# Patient Record
Sex: FEMALE | Race: OTHER | HISPANIC OR LATINO | URBAN - METROPOLITAN AREA
[De-identification: names, ages, dates, MRNs, and addresses within clinical notes are randomized per-mention and may not be internally consistent; named-entity substitution may affect disease eponyms.]

---

## 2019-10-31 ENCOUNTER — EMERGENCY (EMERGENCY)
Facility: HOSPITAL | Age: 39
LOS: 0 days | Discharge: HOME | End: 2019-10-31
Attending: EMERGENCY MEDICINE | Admitting: EMERGENCY MEDICINE
Payer: MEDICAID

## 2019-10-31 VITALS
DIASTOLIC BLOOD PRESSURE: 72 MMHG | RESPIRATION RATE: 18 BRPM | HEART RATE: 96 BPM | TEMPERATURE: 99 F | SYSTOLIC BLOOD PRESSURE: 150 MMHG | OXYGEN SATURATION: 100 %

## 2019-10-31 DIAGNOSIS — D64.9 ANEMIA, UNSPECIFIED: ICD-10-CM

## 2019-10-31 DIAGNOSIS — R00.2 PALPITATIONS: ICD-10-CM

## 2019-10-31 DIAGNOSIS — N12 TUBULO-INTERSTITIAL NEPHRITIS, NOT SPECIFIED AS ACUTE OR CHRONIC: ICD-10-CM

## 2019-10-31 DIAGNOSIS — E87.6 HYPOKALEMIA: ICD-10-CM

## 2019-10-31 DIAGNOSIS — R10.84 GENERALIZED ABDOMINAL PAIN: ICD-10-CM

## 2019-10-31 LAB
ALBUMIN SERPL ELPH-MCNC: 4.2 G/DL — SIGNIFICANT CHANGE UP (ref 3.5–5.2)
ALP SERPL-CCNC: 56 U/L — SIGNIFICANT CHANGE UP (ref 30–115)
ALT FLD-CCNC: 9 U/L — SIGNIFICANT CHANGE UP (ref 0–41)
ANION GAP SERPL CALC-SCNC: 13 MMOL/L — SIGNIFICANT CHANGE UP (ref 7–14)
APPEARANCE UR: ABNORMAL
AST SERPL-CCNC: 15 U/L — SIGNIFICANT CHANGE UP (ref 0–41)
BACTERIA # UR AUTO: ABNORMAL
BASOPHILS # BLD AUTO: 0.01 K/UL — SIGNIFICANT CHANGE UP (ref 0–0.2)
BASOPHILS NFR BLD AUTO: 0.3 % — SIGNIFICANT CHANGE UP (ref 0–1)
BILIRUB DIRECT SERPL-MCNC: <0.2 MG/DL — SIGNIFICANT CHANGE UP (ref 0–0.2)
BILIRUB INDIRECT FLD-MCNC: >0.3 MG/DL — SIGNIFICANT CHANGE UP (ref 0.2–1.2)
BILIRUB SERPL-MCNC: 0.5 MG/DL — SIGNIFICANT CHANGE UP (ref 0.2–1.2)
BILIRUB UR-MCNC: NEGATIVE — SIGNIFICANT CHANGE UP
BUN SERPL-MCNC: 9 MG/DL — LOW (ref 10–20)
CALCIUM SERPL-MCNC: 9.1 MG/DL — SIGNIFICANT CHANGE UP (ref 8.5–10.1)
CHLORIDE SERPL-SCNC: 98 MMOL/L — SIGNIFICANT CHANGE UP (ref 98–110)
CO2 SERPL-SCNC: 26 MMOL/L — SIGNIFICANT CHANGE UP (ref 17–32)
COLOR SPEC: YELLOW — SIGNIFICANT CHANGE UP
CREAT SERPL-MCNC: 0.5 MG/DL — LOW (ref 0.7–1.5)
DIFF PNL FLD: NEGATIVE — SIGNIFICANT CHANGE UP
EOSINOPHIL # BLD AUTO: 0.02 K/UL — SIGNIFICANT CHANGE UP (ref 0–0.7)
EOSINOPHIL NFR BLD AUTO: 0.5 % — SIGNIFICANT CHANGE UP (ref 0–8)
EPI CELLS # UR: 6 /HPF — HIGH (ref 0–5)
GLUCOSE SERPL-MCNC: 97 MG/DL — SIGNIFICANT CHANGE UP (ref 70–99)
GLUCOSE UR QL: NEGATIVE — SIGNIFICANT CHANGE UP
HCT VFR BLD CALC: 26.4 % — LOW (ref 37–47)
HGB BLD-MCNC: 7.3 G/DL — LOW (ref 12–16)
HYALINE CASTS # UR AUTO: 35 /LPF — HIGH (ref 0–7)
IMM GRANULOCYTES NFR BLD AUTO: 0 % — LOW (ref 0.1–0.3)
KETONES UR-MCNC: ABNORMAL
LACTATE SERPL-SCNC: 0.9 MMOL/L — SIGNIFICANT CHANGE UP (ref 0.5–2.2)
LEUKOCYTE ESTERASE UR-ACNC: ABNORMAL
LIDOCAIN IGE QN: 210 U/L — HIGH (ref 7–60)
LYMPHOCYTES # BLD AUTO: 1.33 K/UL — SIGNIFICANT CHANGE UP (ref 1.2–3.4)
LYMPHOCYTES # BLD AUTO: 34.8 % — SIGNIFICANT CHANGE UP (ref 20.5–51.1)
MAGNESIUM SERPL-MCNC: 2.2 MG/DL — SIGNIFICANT CHANGE UP (ref 1.8–2.4)
MCHC RBC-ENTMCNC: 17.5 PG — LOW (ref 27–31)
MCHC RBC-ENTMCNC: 27.7 G/DL — LOW (ref 32–37)
MCV RBC AUTO: 63.5 FL — LOW (ref 81–99)
MONOCYTES # BLD AUTO: 0.34 K/UL — SIGNIFICANT CHANGE UP (ref 0.1–0.6)
MONOCYTES NFR BLD AUTO: 8.9 % — SIGNIFICANT CHANGE UP (ref 1.7–9.3)
NEUTROPHILS # BLD AUTO: 2.12 K/UL — SIGNIFICANT CHANGE UP (ref 1.4–6.5)
NEUTROPHILS NFR BLD AUTO: 55.5 % — SIGNIFICANT CHANGE UP (ref 42.2–75.2)
NITRITE UR-MCNC: NEGATIVE — SIGNIFICANT CHANGE UP
NRBC # BLD: 0 /100 WBCS — SIGNIFICANT CHANGE UP (ref 0–0)
NT-PROBNP SERPL-SCNC: 41 PG/ML — SIGNIFICANT CHANGE UP (ref 0–300)
PH UR: 6 — SIGNIFICANT CHANGE UP (ref 5–8)
PLATELET # BLD AUTO: 200 K/UL — SIGNIFICANT CHANGE UP (ref 130–400)
POTASSIUM SERPL-MCNC: 2.9 MMOL/L — LOW (ref 3.5–5)
POTASSIUM SERPL-SCNC: 2.9 MMOL/L — LOW (ref 3.5–5)
PROT SERPL-MCNC: 7.5 G/DL — SIGNIFICANT CHANGE UP (ref 6–8)
PROT UR-MCNC: ABNORMAL
RBC # BLD: 4.16 M/UL — LOW (ref 4.2–5.4)
RBC # FLD: 20.8 % — HIGH (ref 11.5–14.5)
RBC CASTS # UR COMP ASSIST: 15 /HPF — HIGH (ref 0–4)
SODIUM SERPL-SCNC: 137 MMOL/L — SIGNIFICANT CHANGE UP (ref 135–146)
SP GR SPEC: 1.02 — SIGNIFICANT CHANGE UP (ref 1.01–1.02)
TROPONIN T SERPL-MCNC: <0.01 NG/ML — SIGNIFICANT CHANGE UP
UROBILINOGEN FLD QL: SIGNIFICANT CHANGE UP
WBC # BLD: 3.82 K/UL — LOW (ref 4.8–10.8)
WBC # FLD AUTO: 3.82 K/UL — LOW (ref 4.8–10.8)
WBC UR QL: 42 /HPF — HIGH (ref 0–5)

## 2019-10-31 PROCEDURE — 74177 CT ABD & PELVIS W/CONTRAST: CPT | Mod: 26

## 2019-10-31 PROCEDURE — 71046 X-RAY EXAM CHEST 2 VIEWS: CPT | Mod: 26

## 2019-10-31 PROCEDURE — 93010 ELECTROCARDIOGRAM REPORT: CPT

## 2019-10-31 PROCEDURE — 99053 MED SERV 10PM-8AM 24 HR FAC: CPT

## 2019-10-31 PROCEDURE — 99285 EMERGENCY DEPT VISIT HI MDM: CPT

## 2019-10-31 RX ORDER — SODIUM CHLORIDE 9 MG/ML
1000 INJECTION, SOLUTION INTRAVENOUS ONCE
Refills: 0 | Status: COMPLETED | OUTPATIENT
Start: 2019-10-31 | End: 2019-10-31

## 2019-10-31 RX ORDER — CEFDINIR 250 MG/5ML
1 POWDER, FOR SUSPENSION ORAL
Qty: 20 | Refills: 0
Start: 2019-10-31 | End: 2019-11-09

## 2019-10-31 RX ORDER — POTASSIUM CHLORIDE 20 MEQ
40 PACKET (EA) ORAL ONCE
Refills: 0 | Status: COMPLETED | OUTPATIENT
Start: 2019-10-31 | End: 2019-10-31

## 2019-10-31 RX ADMIN — SODIUM CHLORIDE 1000 MILLILITER(S): 9 INJECTION, SOLUTION INTRAVENOUS at 08:01

## 2019-10-31 NOTE — ED PROVIDER NOTE - ATTENDING CONTRIBUTION TO CARE
40 y/o female with no relevant PMHx presents to ED for generalized abdominal pain x 3 weeks.  No fever or chills.  (+) dysuria with frequency.  No hematuria.  No vomiting or diarrhea.  No fever or chills.  No CP/SOB.  PE:  agree with above.  A/P:  Abdominal Pain.  Labs, Imaging and Reassess.

## 2019-10-31 NOTE — ED PROVIDER NOTE - PHYSICAL EXAMINATION
VITAL SIGNS: I have reviewed nursing notes and confirm.  CONSTITUTIONAL: Well-developed; well-nourished; in no acute distress. pt comfortable lying in bed.   SKIN: skin exam is warm and dry, no acute rash.   HEAD: Normocephalic; atraumatic.  EYES:  EOM intact; conjunctiva and sclera clear.  ENT: No nasal discharge; airway clear. moist oral mucosa;   NECK: Supple; non tender.  CARD: S1, S2 normal; no murmurs, gallops, or rubs. Regular rate and rhythm. posterior tibial and radial pulses 2+  RESP: No wheezes, rales or rhonchi. cta b/l. no use of accessory muscles. no retractions  ABD: Normal bowel sounds; soft; non-distended; +generalized tenderness; no rebound. negative psoas, rovsign's and murphys.  EXT: Normal ROM. No  cyanosis or edema.  BACK: No cva tenderness  LYMPH: No acute cervical adenopathy.  NEURO: Alert, oriented, grossly unremarkable.    PSYCH: Cooperative, appropriate. VITAL SIGNS: I have reviewed nursing notes and confirm.  CONSTITUTIONAL: Well-developed; well-nourished; in no acute distress. pt comfortable lying in bed.   SKIN: skin exam is warm and dry, no acute rash.   HEAD: Normocephalic; atraumatic.  EYES:  EOM intact; conjunctiva and sclera clear.  ENT: No nasal discharge; airway clear. moist oral mucosa;   NECK: Supple; non tender.  CARD: S1, S2 normal; no murmurs, gallops, or rubs. Regular rate and rhythm. posterior tibial and radial pulses 2+  RESP: No wheezes, rales or rhonchi. cta b/l. no use of accessory muscles. no retractions  ABD: Normal bowel sounds; soft; non-distended; +generalized tenderness; no rebound. negative psoas, rovsign's and murphys.  Rectal exam: brown stool. no brbpr, no melena  EXT: Normal ROM. No  cyanosis or edema.  BACK: No cva tenderness  LYMPH: No acute cervical adenopathy.  NEURO: Alert, oriented, grossly unremarkable.    PSYCH: Cooperative, appropriate.

## 2019-10-31 NOTE — ED ADULT NURSE NOTE - OBJECTIVE STATEMENT
39 year old female complaining of generalized abdominal pain for 20 days, last bowel movement 8 days ago, patient denies any n/v/d. patient states she had palpations starting two days ago, denies any chest pain or sob. patient denies any gu s/s,

## 2019-10-31 NOTE — ED PROVIDER NOTE - OBJECTIVE STATEMENT
38y/o F w/ no pmh presents for generalized abdominal pain on and off for 20 days, getting worse 7/10.  pt denies diarrhea or vomiting.  last bm 8 days ago, no hx of constipation, no hx of surgeries, +passing gas. no sick contacts. yesterday and today pt with chills, no fever taken.  today pt felt her heart raising.  no sob, no cp.

## 2019-10-31 NOTE — ED PROVIDER NOTE - CARE PROVIDER_API CALL
Jacinto Redmond)  Gastroenterology; Internal Medicine  [address] [place] [address]  Phone: [phone]  Fax: [phone]  Follow Up Time:

## 2019-10-31 NOTE — ED PROVIDER NOTE - NSFOLLOWUPINSTRUCTIONS_ED_ALL_ED_FT
Pyelonephritis    Pyelonephritis is a kidney infection. In most cases, the infection clears up with treatment and does not cause further problems. More severe infections or chronic infections can sometimes spread to the bloodstream or lead to other problems with the kidneys. Symptoms include frequent or painful urination, abdominal pain, back pain, flank pain, fever/chills, nausea, or vomiting. If you were prescribed an antibiotic medicine, take it as told by your health care provider. Do not stop taking the antibiotic even if you start to feel better.    SEEK IMMEDIATE MEDICAL CARE IF YOU HAVE ANY OF THE FOLLOWING SYMPTOMS: inability to hold down antibiotics or fluids, worsening pain, dizziness/lightheadedness, or change in mental status.    Siga con samson doctor primario, obgyn y gastroenterologo.

## 2019-10-31 NOTE — ED PROVIDER NOTE - PATIENT PORTAL LINK FT
You can access the FollowMyHealth Patient Portal offered by [facility] by registering at the following website: [email or web address]. By joining [employer]’s FollowMyHealth portal, you will also be able to view your health information using other applications (apps) compatible with our system.

## 2019-10-31 NOTE — ED PROVIDER NOTE - CLINICAL SUMMARY MEDICAL DECISION MAKING FREE TEXT BOX
CT shows possible pyelonephritis.  Patient known to have anemia.  Will f/u with PMD.  Rx given Abx.  Stable for dc

## 2019-10-31 NOTE — ED PROVIDER NOTE - CARE PLAN
Principal Discharge DX:	Pyelonephritis Principal Discharge DX:	Pyelonephritis  Secondary Diagnosis:	Anemia  Secondary Diagnosis:	Hypokalemia

## 2019-10-31 NOTE — ED PROVIDER NOTE - NSFOLLOWUPCLINICS_GEN_ALL_ED_FT
[facility]  Medicine  [address], [place]   Phone: [phone]  Fax:     [facility]  OB/GYN  [address] [place] [address]  Phone: [phone]  Fax:   Follow Up Time:

## 2019-11-01 LAB
CULTURE RESULTS: NO GROWTH — SIGNIFICANT CHANGE UP
SPECIMEN SOURCE: SIGNIFICANT CHANGE UP

## 2019-11-09 ENCOUNTER — INPATIENT (INPATIENT)
Facility: HOSPITAL | Age: 39
LOS: 1 days | Discharge: HOME | End: 2019-11-11
Attending: HOSPITALIST | Admitting: HOSPITALIST
Payer: MEDICAID

## 2019-11-09 VITALS
RESPIRATION RATE: 20 BRPM | TEMPERATURE: 98 F | OXYGEN SATURATION: 100 % | HEART RATE: 98 BPM | SYSTOLIC BLOOD PRESSURE: 137 MMHG | DIASTOLIC BLOOD PRESSURE: 84 MMHG

## 2019-11-09 LAB
ABO RH CONFIRMATION: SIGNIFICANT CHANGE UP
ALBUMIN SERPL ELPH-MCNC: 4.2 G/DL — SIGNIFICANT CHANGE UP (ref 3.5–5.2)
ALP SERPL-CCNC: 53 U/L — SIGNIFICANT CHANGE UP (ref 30–115)
ALT FLD-CCNC: 14 U/L — SIGNIFICANT CHANGE UP (ref 0–41)
ANION GAP SERPL CALC-SCNC: 12 MMOL/L — SIGNIFICANT CHANGE UP (ref 7–14)
AST SERPL-CCNC: 17 U/L — SIGNIFICANT CHANGE UP (ref 0–41)
BILIRUB SERPL-MCNC: 0.4 MG/DL — SIGNIFICANT CHANGE UP (ref 0.2–1.2)
BLD GP AB SCN SERPL QL: SIGNIFICANT CHANGE UP
BUN SERPL-MCNC: 4 MG/DL — LOW (ref 10–20)
CALCIUM SERPL-MCNC: 8.9 MG/DL — SIGNIFICANT CHANGE UP (ref 8.5–10.1)
CHLORIDE SERPL-SCNC: 104 MMOL/L — SIGNIFICANT CHANGE UP (ref 98–110)
CO2 SERPL-SCNC: 22 MMOL/L — SIGNIFICANT CHANGE UP (ref 17–32)
CREAT SERPL-MCNC: <0.5 MG/DL — LOW (ref 0.7–1.5)
GLUCOSE SERPL-MCNC: 91 MG/DL — SIGNIFICANT CHANGE UP (ref 70–99)
HCT VFR BLD CALC: 25.4 % — LOW (ref 37–47)
HGB BLD-MCNC: 6.9 G/DL — CRITICAL LOW (ref 12–16)
LACTATE SERPL-SCNC: 0.8 MMOL/L — SIGNIFICANT CHANGE UP (ref 0.5–2.2)
LIDOCAIN IGE QN: 89 U/L — HIGH (ref 7–60)
MCHC RBC-ENTMCNC: 17.5 PG — LOW (ref 27–31)
MCHC RBC-ENTMCNC: 27.2 G/DL — LOW (ref 32–37)
MCV RBC AUTO: 64.3 FL — LOW (ref 81–99)
NRBC # BLD: 0 /100 WBCS — SIGNIFICANT CHANGE UP (ref 0–0)
PLATELET # BLD AUTO: 246 K/UL — SIGNIFICANT CHANGE UP (ref 130–400)
POTASSIUM SERPL-MCNC: 3.6 MMOL/L — SIGNIFICANT CHANGE UP (ref 3.5–5)
POTASSIUM SERPL-SCNC: 3.6 MMOL/L — SIGNIFICANT CHANGE UP (ref 3.5–5)
PROT SERPL-MCNC: 7.2 G/DL — SIGNIFICANT CHANGE UP (ref 6–8)
RBC # BLD: 3.95 M/UL — LOW (ref 4.2–5.4)
RBC # FLD: 20.8 % — HIGH (ref 11.5–14.5)
SODIUM SERPL-SCNC: 138 MMOL/L — SIGNIFICANT CHANGE UP (ref 135–146)
WBC # BLD: 3.77 K/UL — LOW (ref 4.8–10.8)
WBC # FLD AUTO: 3.77 K/UL — LOW (ref 4.8–10.8)

## 2019-11-09 PROCEDURE — 74019 RADEX ABDOMEN 2 VIEWS: CPT | Mod: 26

## 2019-11-09 PROCEDURE — 99285 EMERGENCY DEPT VISIT HI MDM: CPT

## 2019-11-09 RX ORDER — SODIUM CHLORIDE 9 MG/ML
1000 INJECTION INTRAMUSCULAR; INTRAVENOUS; SUBCUTANEOUS ONCE
Refills: 0 | Status: COMPLETED | OUTPATIENT
Start: 2019-11-09 | End: 2019-11-09

## 2019-11-09 RX ORDER — INFLUENZA VIRUS VACCINE 15; 15; 15; 15 UG/.5ML; UG/.5ML; UG/.5ML; UG/.5ML
0.5 SUSPENSION INTRAMUSCULAR ONCE
Refills: 0 | Status: DISCONTINUED | OUTPATIENT
Start: 2019-11-09 | End: 2019-11-10

## 2019-11-09 RX ADMIN — SODIUM CHLORIDE 1000 MILLILITER(S): 9 INJECTION INTRAMUSCULAR; INTRAVENOUS; SUBCUTANEOUS at 18:58

## 2019-11-09 NOTE — ED PROVIDER NOTE - OBJECTIVE STATEMENT
40 y/o female with no significant PMH who presents to the ED for evaluation of constipation and abdominal pain. Patient states she has not had a bowel movement or passed gas in the last two weeks. abdominal pain has gradually worsened, nonradiating, and described at crampy in nature. patient states she has been drinking olive oil for constipation which has not alleviated her symptoms. patient admits to one episode of vomiting today, and feels nauseous. patient is tolerating PO. Patient was seen on 10/31 for similar symptoms, and was diagnosed with pyelonephritis, and a right adnexal cyst.  Patient denies abdominal surgery, fever, chills, bodyaches, rashes, joint pain, bleeding from the rectum, urinary symptoms, chest pain, sob, or back pain.

## 2019-11-09 NOTE — ED PROVIDER NOTE - ATTENDING CONTRIBUTION TO CARE
40 yo F presents to Ed for constipation and abdominal discomfort x2 weeks. Patient state she has had small pellet like BM. No blood or black stool. No dysuria, hematuria, vaginal discharge. No CP, SOB, palpitations, fever, chills.     On PE patient's abdomen is SNTND. Cardio RRR, lungs CTA.    Concern for constipation. Patient was in the ED recently for abdominal pain who had a CT scan with no significant bowel pathology. No abdominal surgeries.     Will do basic blood work and abdominal xray.

## 2019-11-09 NOTE — ED PROVIDER NOTE - CLINICAL SUMMARY MEDICAL DECISION MAKING FREE TEXT BOX
38 yo F presented to ED for abdominal pain due to constipation.   Patient was found to have HGB of 6.9. She had a HGB of 7.3 in october but has never been worked up for anemia in the past. No rectal bleeding.   Will give blood and admit for new onset anemia.

## 2019-11-09 NOTE — ED PROVIDER NOTE - PHYSICAL EXAMINATION
CONST: Well appearing in NAD  EYES: PERRL, EOMI, Sclera and conjunctiva clear.   ENT: No nasal discharge. oropharynx normal appearing, no erythema or exudates. Uvula midline.  CARD: Normal S1 S2; Normal rate and rhythm  RESP: Equal BS B/L, No wheezes, rhonchi or rales. No distress  GI: tenderness to palpation of bilateral lower quadrants. Soft, non-distended. no organomegaly. negative murphys sign. no bowel sounds to ausculation.   SKIN: Warm, dry, no acute rashes. mild jaundice noted.   NEURO: A&Ox3. CONST: Well appearing in NAD  EYES: PERRL, EOMI, Sclera and conjunctiva clear. pyterium of right eye.   ENT: No nasal discharge. oropharynx normal appearing, no erythema or exudates. Uvula midline.  CARD: Normal S1 S2; Normal rate and rhythm  RESP: Equal BS B/L, No wheezes, rhonchi or rales. No distress  GI: tenderness to palpation of bilateral lower quadrants. Soft, non-distended. no organomegaly. negative murphys sign. no bowel sounds to ausculation.   SKIN: Warm, dry, no acute rashes.   NEURO: A&Ox3. CONST: Well appearing in NAD  EYES: PERRL, EOMI, Sclera and conjunctiva clear. pyterium of right eye.   ENT: No nasal discharge. oropharynx normal appearing, no erythema or exudates. Uvula midline.  CARD: Normal S1 S2; Normal rate and rhythm  RESP: Equal BS B/L, No wheezes, rhonchi or rales. No distress  GI: tenderness to palpation of bilateral lower quadrants. Soft, non-distended. no organomegaly. negative murphys sign. decrease bowel sounds to ausculation.   RECTAL: no hemorrhoids. no fissures. no bleeding. minimal stool noted at the distal rectum. no masses palpated. no blood in the stool. rectal exam chaperoned by CHIO Ptael.    SKIN: Warm, dry, no acute rashes.   NEURO: A&Ox3.

## 2019-11-09 NOTE — ED PROVIDER NOTE - PROGRESS NOTE DETAILS
Made appointment for patient to fu in the clinic.   Monday at 12:45 Sign out to Dr. Coe.   Patient will receive 1 unit of RBC and go home with fu in the clinic on Monday for further anemia workup. CHIO Patel: I was directly involved in the care and management of this patient while supervising PA Fellow Stacia

## 2019-11-09 NOTE — ED PROVIDER NOTE - NS ED ROS FT
CONST: No fever, chills or bodyaches  EYES: No pain, redness, drainage or visual changes.  ENT: No ear pain or discharge, nasal discharge or congestion. No sore throat  CARD: No chest pain, palpitations  RESP: No SOB, cough, hemoptysis. No hx of asthma or COPD  GI: abdominal pain and nausea. constipation, and obstipation.    MS: No joint pain, back pain or extremity pain/injury  SKIN: No rashes  NEURO: No headache, dizziness, paresthesias or LOC

## 2019-11-09 NOTE — ED ADULT NURSE REASSESSMENT NOTE - NS ED NURSE REASSESS COMMENT FT1
Received pt from RN. PT A&O x3 comfortable appearing conversing with family members at bedside, Last VS WNL.m Ordered for T&S for lab and 1 unit PRBC for low H/H. Pt denies any discomfort of pain.
Pt started on PRBC 1 unit 2 RN to bedside, 15 minute check done, no distress noted. VS WNL. Pt admitted to [facility] as per MD to transfer on completion. Pt made aware.

## 2019-11-10 DIAGNOSIS — D64.9 ANEMIA, UNSPECIFIED: ICD-10-CM

## 2019-11-10 DIAGNOSIS — N83.209 UNSPECIFIED OVARIAN CYST, UNSPECIFIED SIDE: ICD-10-CM

## 2019-11-10 DIAGNOSIS — K59.00 CONSTIPATION, UNSPECIFIED: ICD-10-CM

## 2019-11-10 LAB
ANION GAP SERPL CALC-SCNC: 13 MMOL/L — SIGNIFICANT CHANGE UP (ref 7–14)
APPEARANCE UR: CLEAR — SIGNIFICANT CHANGE UP
APTT BLD: 29.8 SEC — SIGNIFICANT CHANGE UP (ref 27–39.2)
BACTERIA # UR AUTO: ABNORMAL
BILIRUB UR-MCNC: NEGATIVE — SIGNIFICANT CHANGE UP
BUN SERPL-MCNC: <3 MG/DL — LOW (ref 10–20)
CALCIUM SERPL-MCNC: 9.4 MG/DL — SIGNIFICANT CHANGE UP (ref 8.5–10.1)
CHLORIDE SERPL-SCNC: 104 MMOL/L — SIGNIFICANT CHANGE UP (ref 98–110)
CO2 SERPL-SCNC: 22 MMOL/L — SIGNIFICANT CHANGE UP (ref 17–32)
COLOR SPEC: YELLOW — SIGNIFICANT CHANGE UP
COMMENT - URINE: SIGNIFICANT CHANGE UP
CREAT SERPL-MCNC: <0.5 MG/DL — LOW (ref 0.7–1.5)
DIFF PNL FLD: ABNORMAL
EPI CELLS # UR: ABNORMAL /HPF
GLUCOSE SERPL-MCNC: 94 MG/DL — SIGNIFICANT CHANGE UP (ref 70–99)
GLUCOSE UR QL: NEGATIVE — SIGNIFICANT CHANGE UP
HCG UR QL: NEGATIVE — SIGNIFICANT CHANGE UP
HCT VFR BLD CALC: 31.8 % — LOW (ref 37–47)
HGB BLD-MCNC: 9 G/DL — LOW (ref 12–16)
INR BLD: 1.14 RATIO — SIGNIFICANT CHANGE UP (ref 0.65–1.3)
KETONES UR-MCNC: 80 — SIGNIFICANT CHANGE UP
LEUKOCYTE ESTERASE UR-ACNC: NEGATIVE — SIGNIFICANT CHANGE UP
MAGNESIUM SERPL-MCNC: 2.1 MG/DL — SIGNIFICANT CHANGE UP (ref 1.8–2.4)
MCHC RBC-ENTMCNC: 19.2 PG — LOW (ref 27–31)
MCHC RBC-ENTMCNC: 28.3 G/DL — LOW (ref 32–37)
MCV RBC AUTO: 67.8 FL — LOW (ref 81–99)
NITRITE UR-MCNC: NEGATIVE — SIGNIFICANT CHANGE UP
NRBC # BLD: 0 /100 WBCS — SIGNIFICANT CHANGE UP (ref 0–0)
PH UR: 7 — SIGNIFICANT CHANGE UP (ref 5–8)
PLATELET # BLD AUTO: 244 K/UL — SIGNIFICANT CHANGE UP (ref 130–400)
POTASSIUM SERPL-MCNC: 3.9 MMOL/L — SIGNIFICANT CHANGE UP (ref 3.5–5)
POTASSIUM SERPL-SCNC: 3.9 MMOL/L — SIGNIFICANT CHANGE UP (ref 3.5–5)
PROT UR-MCNC: NEGATIVE — SIGNIFICANT CHANGE UP
PROTHROM AB SERPL-ACNC: 13.1 SEC — HIGH (ref 9.95–12.87)
RBC # BLD: 4.69 M/UL — SIGNIFICANT CHANGE UP (ref 4.2–5.4)
RBC # FLD: 22.4 % — HIGH (ref 11.5–14.5)
RBC CASTS # UR COMP ASSIST: ABNORMAL /HPF
SODIUM SERPL-SCNC: 139 MMOL/L — SIGNIFICANT CHANGE UP (ref 135–146)
SP GR SPEC: 1.02 — SIGNIFICANT CHANGE UP (ref 1.01–1.03)
UROBILINOGEN FLD QL: 0.2 — SIGNIFICANT CHANGE UP (ref 0.2–0.2)
WBC # BLD: 4.04 K/UL — LOW (ref 4.8–10.8)
WBC # FLD AUTO: 4.04 K/UL — LOW (ref 4.8–10.8)
WBC UR QL: SIGNIFICANT CHANGE UP /HPF

## 2019-11-10 PROCEDURE — 99223 1ST HOSP IP/OBS HIGH 75: CPT | Mod: AI

## 2019-11-10 PROCEDURE — 76856 US EXAM PELVIC COMPLETE: CPT | Mod: 26

## 2019-11-10 RX ORDER — ACETAMINOPHEN 500 MG
650 TABLET ORAL EVERY 6 HOURS
Refills: 0 | Status: DISCONTINUED | OUTPATIENT
Start: 2019-11-10 | End: 2019-11-11

## 2019-11-10 RX ORDER — CHLORHEXIDINE GLUCONATE 213 G/1000ML
1 SOLUTION TOPICAL
Refills: 0 | Status: DISCONTINUED | OUTPATIENT
Start: 2019-11-10 | End: 2019-11-11

## 2019-11-10 RX ORDER — FERROUS SULFATE 325(65) MG
325 TABLET ORAL ONCE
Refills: 0 | Status: COMPLETED | OUTPATIENT
Start: 2019-11-10 | End: 2019-11-10

## 2019-11-10 RX ORDER — LACTULOSE 10 G/15ML
10 SOLUTION ORAL ONCE
Refills: 0 | Status: COMPLETED | OUTPATIENT
Start: 2019-11-10 | End: 2019-11-10

## 2019-11-10 RX ORDER — LACTULOSE 10 G/15ML
10 SOLUTION ORAL THREE TIMES A DAY
Refills: 0 | Status: DISCONTINUED | OUTPATIENT
Start: 2019-11-10 | End: 2019-11-11

## 2019-11-10 RX ORDER — FAMOTIDINE 10 MG/ML
20 INJECTION INTRAVENOUS DAILY
Refills: 0 | Status: DISCONTINUED | OUTPATIENT
Start: 2019-11-10 | End: 2019-11-10

## 2019-11-10 RX ORDER — FERROUS SULFATE 325(65) MG
1 TABLET ORAL
Qty: 60 | Refills: 0
Start: 2019-11-10 | End: 2019-12-09

## 2019-11-10 RX ORDER — LACTULOSE 10 G/15ML
15 SOLUTION ORAL
Qty: 5 | Refills: 0
Start: 2019-11-10

## 2019-11-10 RX ORDER — ONDANSETRON 8 MG/1
4 TABLET, FILM COATED ORAL EVERY 8 HOURS
Refills: 0 | Status: DISCONTINUED | OUTPATIENT
Start: 2019-11-10 | End: 2019-11-11

## 2019-11-10 RX ORDER — FERROUS SULFATE 325(65) MG
325 TABLET ORAL DAILY
Refills: 0 | Status: DISCONTINUED | OUTPATIENT
Start: 2019-11-10 | End: 2019-11-11

## 2019-11-10 RX ORDER — LACTULOSE 10 G/15ML
15 SOLUTION ORAL
Qty: 0 | Refills: 0 | DISCHARGE
Start: 2019-11-10

## 2019-11-10 RX ORDER — FAMOTIDINE 10 MG/ML
20 INJECTION INTRAVENOUS DAILY
Refills: 0 | Status: DISCONTINUED | OUTPATIENT
Start: 2019-11-10 | End: 2019-11-11

## 2019-11-10 RX ADMIN — FAMOTIDINE 20 MILLIGRAM(S): 10 INJECTION INTRAVENOUS at 10:32

## 2019-11-10 RX ADMIN — LACTULOSE 10 GRAM(S): 10 SOLUTION ORAL at 10:22

## 2019-11-10 RX ADMIN — LACTULOSE 10 GRAM(S): 10 SOLUTION ORAL at 14:44

## 2019-11-10 RX ADMIN — ONDANSETRON 4 MILLIGRAM(S): 8 TABLET, FILM COATED ORAL at 10:37

## 2019-11-10 RX ADMIN — ONDANSETRON 4 MILLIGRAM(S): 8 TABLET, FILM COATED ORAL at 21:54

## 2019-11-10 RX ADMIN — LACTULOSE 10 GRAM(S): 10 SOLUTION ORAL at 21:33

## 2019-11-10 RX ADMIN — Medication 325 MILLIGRAM(S): at 10:22

## 2019-11-10 NOTE — DISCHARGE NOTE PROVIDER - NSDCMRMEDTOKEN_GEN_ALL_CORE_FT
cefdinir 300 mg oral capsule: 1 cap(s) orally 2 times a day ferrous sulfate 325 mg (65 mg elemental iron) oral tablet: 1 tab(s) orally 2 times a day Colace 100 mg oral capsule: 1 cap(s) orally 3 times a day   ferrous sulfate 325 mg (65 mg elemental iron) oral tablet: 1 tab(s) orally 2 times a day

## 2019-11-10 NOTE — DISCHARGE NOTE PROVIDER - CARE PROVIDER_API CALL
Herber Oconnell)  Internal Medicine  [address] [place] [address]  Phone: [phone]  Fax: [phone]  Follow Up Time:

## 2019-11-10 NOTE — H&P ADULT - HISTORY OF PRESENT ILLNESS
A 39 female with recent diagnosis of anemia 10/31 , ovarian cyst, presents with 1x week of abdominal pain and constipation, Pt reports having crampy abdominal pain , points at lower quadrants. pt denies similar abdominal pain in the past. PT reports did not have bowel movement or passing gas x 4 days.  PT reports usually has bowel movement every day. Pt reports took olive oil but did not help. Pt denies constipation in the past. PT reports nausea and vomiting since yesterday , vomited 3x , last time this morning, pt not sure if blood in vomit. Pt denies seen blood in stool in the past.    Pt reports has appetite but is scared to eat because she might vomit.   In ed pt was found to have Hbg 6.9 slight drop compared to 10/31 from 7.3.   PT reports finished her menstrual cycle yesterday, usually lasts for 6 days is regular, this time was a little heavier. Pt received one unit of blood in ED. PT denies fever or chills. Pt reports dizziness and shortness of breath. PT denies chest pain, burning with urination, palpitation, diarrhea.

## 2019-11-10 NOTE — H&P ADULT - ASSESSMENT
A 39 female with recent diagnosis of anemia 10/31 , ovarian cyst, presents  with 1x week of abdominal pain admitted for constipation and anemia.

## 2019-11-10 NOTE — DISCHARGE NOTE PROVIDER - NSDCCPCAREPLAN_GEN_ALL_CORE_FT
PRINCIPAL DISCHARGE DIAGNOSIS  Diagnosis: Anemia  Assessment and Plan of Treatment:       SECONDARY DISCHARGE DIAGNOSES  Diagnosis: Constipation  Assessment and Plan of Treatment: PRINCIPAL DISCHARGE DIAGNOSIS  Diagnosis: Anemia  Assessment and Plan of Treatment: take iron pills.  MAKE APPOINTMENT WITH OBGYN      SECONDARY DISCHARGE DIAGNOSES  Diagnosis: Constipation  Assessment and Plan of Treatment: take stool softeners

## 2019-11-10 NOTE — DISCHARGE NOTE PROVIDER - NSFOLLOWUPCLINICS_GEN_ALL_ED_FT
[facility]  Medicine  [address], [place]   Phone: [phone]  Fax:   Follow Up Time: 1 week

## 2019-11-10 NOTE — DISCHARGE NOTE PROVIDER - HOSPITAL COURSE
A 39 female with recent diagnosis of anemia 10/31 , ovarian cyst, presents with 1x week of abdominal pain and constipation, Pt reports having crampy abdominal pain , points at lower quadrants. pt denies similar abdominal pain in the past. PT reports did not have bowel movement or passing gas x 4 days.   PT reports nausea and vomiting since yesterday , vomited 3x , last time this morning, pt not sure if blood in vomit. Pt denies seen blood in stool in the past.     Pt tolerated clears in hospital no vomiting since admission     Pt with anemia, in  ed pt was found to have Hbg 6.9  yesterday slight drop compared to 10/31 from 7.3.     Pt received one unit of blood yesterday, repeat hemoglobin 9 today     PT hx of ovarian cyst, US pelvis transvaginal unremarkable     Pt cleared for discharge , case discussed with the attending A 39 female with recent diagnosis of anemia 10/31 , ovarian cyst, presents with 1x week of abdominal pain and constipation, Pt reports having crampy abdominal pain , points at lower quadrants. pt denies similar abdominal pain in the past. PT reports did not have bowel movement or passing gas x 4 days.   PT reports nausea and vomiting since yesterday , vomited 3x , last time this morning, pt not sure if blood in vomit. Pt denies seen blood in stool in the past.     Pt tolerated clears in hospital no vomiting since admission     Pt was given lactulose had 1x bowel movement     Pt with anemia, in  ed pt was found to have Hbg 6.9  yesterday slight drop compared to 10/31 from 7.3.     Pt received one unit of blood yesterday, repeat hemoglobin 9 today     PT hx of ovarian cyst, US pelvis transvaginal unremarkable     Pt cleared for discharge , case discussed with the attending

## 2019-11-10 NOTE — H&P ADULT - ATTENDING COMMENTS
Patient seen and examined independently. Agree with PA note.  39 yr old female presented with c/o crampy abd pain and weakness.  VITAL SIGNS (Last 24 hrs):  T(C): 36.6 (11-10-19 @ 06:20), Max: 37.1 (19 @ 21:00)  HR: 102 (11-10-19 @ 06:20) (78 - 102)  BP: 138/78 (11-10-19 @ 06:20) (122/74 - 138/78)  RR: 18 (11-10-19 @ 06:20) (18 - 20)  SpO2: 100% (11-10-19 @ 04:50) (100% - 100%)  Wt(kg): --  Daily     Daily Weight in k.5 (10 Nov 2019 04:50)    I&O's Summary                        9.0    4.04  )-----------( 244      ( 10 Nov 2019 11:00 )             31.8   11-10    139  |  104  |  <3<L>  ----------------------------<  94  3.9   |  22  |  <0.5<L>    Ca    9.4      10 Nov 2019 11:00  Mg     2.1     11-10    TPro  7.2  /  Alb  4.2  /  TBili  0.4  /  DBili  x   /  AST  17  /  ALT  14  /  AlkPhos  53  11-09  o/e  aaox3  chest-b/l clear  abd-soft, bs+  no edema  assessment and plan:  # Severe anemia-s/p 1unit of PRBC--she had been having her periods and recently it was heavier than before-- pelvic sonogram was normal. Patient seen and examined independently. Agree with PA note.  39 yr old female presented with c/o crampy abd pain and weakness.  VITAL SIGNS (Last 24 hrs):  T(C): 36.6 (11-10-19 @ 06:20), Max: 37.1 (19 @ 21:00)  HR: 102 (11-10-19 @ 06:20) (78 - 102)  BP: 138/78 (11-10-19 @ 06:20) (122/74 - 138/78)  RR: 18 (11-10-19 @ 06:20) (18 - 20)  SpO2: 100% (11-10-19 @ 04:50) (100% - 100%)  Wt(kg): --  Daily     Daily Weight in k.5 (10 Nov 2019 04:50)    I&O's Summary                        9.0    4.04  )-----------( 244      ( 10 Nov 2019 11:00 )             31.8   11-10    139  |  104  |  <3<L>  ----------------------------<  94  3.9   |  22  |  <0.5<L>    Ca    9.4      10 Nov 2019 11:00  Mg     2.1     11-10    TPro  7.2  /  Alb  4.2  /  TBili  0.4  /  DBili  x   /  AST  17  /  ALT  14  /  AlkPhos  53  11-09  o/e  aaox3  chest-b/l clear  abd-soft, bs+  no edema  assessment and plan:  # Severe anemia-s/p 1unit of PRBC--she had been having her periods and recently it was heavier than before-- pelvic sonogram was normal.  she did a ct scan that showed pyelonephritis-- but UA was negative and there is no evidence of any symptoms.  She also confessed that she has not been eating well and has been starving as she has no money to support herself. will give her iron pills-- patuent is advised to repeat cbc later and follow in the [facility] clinic.  # constipation-- continue lactulose for today  and if has a bowel movement-- can go home. Patient seen and examined independently. Agree with PA note.  39 yr old female presented with c/o crampy abd pain and weakness.  VITAL SIGNS (Last 24 hrs):  T(C): 36.6 (11-10-19 @ 06:20), Max: 37.1 (19 @ 21:00)  HR: 102 (11-10-19 @ 06:20) (78 - 102)  BP: 138/78 (11-10-19 @ 06:20) (122/74 - 138/78)  RR: 18 (11-10-19 @ 06:20) (18 - 20)  SpO2: 100% (11-10-19 @ 04:50) (100% - 100%)  Wt(kg): --  Daily     Daily Weight in k.5 (10 Nov 2019 04:50)    I&O's Summary                        9.0    4.04  )-----------( 244      ( 10 Nov 2019 11:00 )             31.8   11-10    139  |  104  |  <3<L>  ----------------------------<  94  3.9   |  22  |  <0.5<L>    Ca    9.4      10 Nov 2019 11:00  Mg     2.1     11-10    TPro  7.2  /  Alb  4.2  /  TBili  0.4  /  DBili  x   /  AST  17  /  ALT  14  /  AlkPhos  53  11-09  o/e  aaox3  chest-b/l clear  abd-soft, bs+  no edema  assessment and plan:  # Severe anemia-s/p 1unit of PRBC--she had been having her periods and recently it was heavier than before-- pelvic sonogram was normal.  she did a ct scan that showed pyelonephritis-- but UA was negative and there is no evidence of any symptoms.  She also confessed that she has not been eating well and has been starving as she has no money to support herself. will give her iron pills-- patient is advised to repeat cbc later and follow in the [facility] clinic.  # constipation-- continue lactulose for today  and if has a bowel movement-- can go home.

## 2019-11-10 NOTE — H&P ADULT - NSHPLABSRESULTS_GEN_ALL_CORE
6.9    3.77  )-----------( 246      ( 09 Nov 2019 18:20 )             25.4       11-09    138  |  104  |  4<L>  ----------------------------<  91  3.6   |  22  |  <0.5<L>    Ca    8.9      09 Nov 2019 18:20    TPro  7.2  /  Alb  4.2  /  TBili  0.4  /  DBili  x   /  AST  17  /  ALT  14  /  AlkPhos  53  11-09            Lactate Trend 11-09 @ 18:20 Lactate:0.8     Culture Results:   No growth (10-31 @ 07:34)      < from: CT Abdomen and Pelvis w/ IV Cont (10.31.19 @ 10:19) >    IMPRESSION:     1. Mildly striated nephrogram involving the right kidney, suspicious for   underlying pyelonephritis; correlation with urinalysis is suggested.    2. Crenated 1.9 cm right adnexal cyst and trace free pelvic fluid, likely   physiologic.    < end of copied text >    < from: Xray Abdomen 2 Views (11.09.19 @ 19:33) >    Findings:     No evidence for bowel obstruction or pneumoperitoneum.  Overall mild colonic stool burden with mild to moderate stool in the   right colon.  No acute osseous abnormality.    Impression:     No evidence of bowel obstruction.      JANN CAIN M.D., ATTENDING RADIOLOGIST  This document has been electronically signed. Nov 9 2019 11:00PM    < end of copied text >

## 2019-11-10 NOTE — DISCHARGE NOTE NURSING/CASE MANAGEMENT/SOCIAL WORK - PATIENT PORTAL LINK FT
You can access the FollowMyHealth Patient Portal offered by [facility] by registering at the following website: [email or web address]. By joining [employer]’s FollowMyHealth portal, you will also be able to view your health information using other applications (apps) compatible with our system.

## 2019-11-10 NOTE — H&P ADULT - NSHPPHYSICALEXAM_GEN_ALL_CORE
VITALS:   ICU Vital Signs Last 24 Hrs  T(C): 36.6 (10 Nov 2019 06:20), Max: 37.1 (09 Nov 2019 21:00)  T(F): 97.8 (10 Nov 2019 06:20), Max: 98.7 (09 Nov 2019 21:00)  HR: 102 (10 Nov 2019 06:20) (78 - 102)  BP: 138/78 (10 Nov 2019 06:20) (122/74 - 138/78)  RR: 18 (10 Nov 2019 06:20) (18 - 20)  SpO2: 100% (10 Nov 2019 04:50) (100% - 100%)        GENERAL: NAD, lying in bed comfortably  HEAD:  Atraumatic, Normocephalic  EYES: EOMI, PERRLA, conjunctiva and sclera clear  CHEST/LUNG: Clear to auscultation bilaterally; No rales, rhonchi, wheezing, or rubs. Unlabored respirations  HEART: Regular rate and rhythm; No murmurs, rubs, or gallops  ABDOMEN: Bowel sounds present; Soft, generalized tenderness, , Nondistended. No hepatomegally  EXTREMITIES:   no edema or tenderness   NERVOUS SYSTEM:  Alert & Oriented X3, speech clear. No deficits   MSK: FROM all 4 extremities, full and equal strength  SKIN: No rashes or lesions

## 2019-11-11 VITALS
SYSTOLIC BLOOD PRESSURE: 100 MMHG | RESPIRATION RATE: 16 BRPM | DIASTOLIC BLOOD PRESSURE: 54 MMHG | TEMPERATURE: 97 F | HEART RATE: 78 BPM

## 2019-11-11 LAB
FERRITIN SERPL-MCNC: 8 NG/ML — LOW (ref 15–150)
HCT VFR BLD CALC: 30.9 % — LOW (ref 37–47)
HGB BLD-MCNC: 8.7 G/DL — LOW (ref 12–16)
IRON SATN MFR SERPL: 10 % — LOW (ref 15–50)
IRON SATN MFR SERPL: 29 UG/DL — LOW (ref 35–150)
MCHC RBC-ENTMCNC: 19.2 PG — LOW (ref 27–31)
MCHC RBC-ENTMCNC: 28.2 G/DL — LOW (ref 32–37)
MCV RBC AUTO: 68.4 FL — LOW (ref 81–99)
NRBC # BLD: 0 /100 WBCS — SIGNIFICANT CHANGE UP (ref 0–0)
PLATELET # BLD AUTO: 234 K/UL — SIGNIFICANT CHANGE UP (ref 130–400)
RBC # BLD: 4.52 M/UL — SIGNIFICANT CHANGE UP (ref 4.2–5.4)
RBC # FLD: 22.7 % — HIGH (ref 11.5–14.5)
TIBC SERPL-MCNC: 299 UG/DL — SIGNIFICANT CHANGE UP (ref 220–430)
UIBC SERPL-MCNC: 270 UG/DL — SIGNIFICANT CHANGE UP (ref 110–370)
WBC # BLD: 4.35 K/UL — LOW (ref 4.8–10.8)
WBC # FLD AUTO: 4.35 K/UL — LOW (ref 4.8–10.8)

## 2019-11-11 PROCEDURE — 99239 HOSP IP/OBS DSCHRG MGMT >30: CPT

## 2019-11-11 RX ORDER — DOCUSATE SODIUM 100 MG
1 CAPSULE ORAL
Qty: 90 | Refills: 0
Start: 2019-11-11 | End: 2019-12-10

## 2019-11-11 RX ORDER — ONDANSETRON 8 MG/1
1 TABLET, FILM COATED ORAL
Qty: 20 | Refills: 0
Start: 2019-11-11

## 2019-11-11 NOTE — PROGRESS NOTE ADULT - ASSESSMENT
A 39 female with recent diagnosis of anemia 10/31 , ovarian cyst, presents with 1x week of abdominal pain and constipation, Pt reports having crampy abdominal pain , points at lower quadrants. pt denies similar abdominal pain in the past. PT reports did not have bowel movement or passing gas x 4 days.   PT reports nausea and vomiting since yesterday , vomited 3x , last time this morning, pt not sure if blood in vomit. Pt denies seen blood in stool in the past.   Pt tolerated clears in hospital no vomiting since admission   Pt was given lactulose had 1x bowel movement   Pt with anemia, in  ed pt was found to have Hbg 6.9  yesterday slight drop compared to 10/31 from 7.3.   Pt received one unit of blood yesterday, repeat hemoglobin 9 today   PT hx of ovarian cyst, US pelvis transvaginal unremarkable       #anemia likely due to menorrhagia - s/p 1U PRBC, start on FeSO4  #constipation/vomiting - resolved, start on stool softners    discharge now

## 2019-11-11 NOTE — PROGRESS NOTE ADULT - SUBJECTIVE AND OBJECTIVE BOX
MALLORY HENRY  39y, Female  Allergy: No Known Allergies    Hospital Day: 2d    Patient seen and examined earlier today.  no complaints, eating and having BM    PMH/PSH:  PAST MEDICAL & SURGICAL HISTORY:  Ovarian cyst  Anemia    VITALS:  T(F): 97.3 (19 @ 05:20), Max: 98.2 (11-10-19 @ 14:18)  HR: 78 (19 @ 05:20)  BP: 100/54 (19 @ 05:20) (100/54 - 112/63)  RR: 16 (19 @ 05:20)  SpO2: --    PHYSICAL EXAM:  GENERAL: NAD  HEENT: MMM  CVS:  RRR  CHEST/LUNG: Good air entry, no wheeze  ABD:  soft, NT/ND +bs  EXTREMITIES:  no edema  NEURO: AOx3    TESTS & MEASUREMENTS:                          8.7    4.35  )-----------( 234      ( 2019 06:30 )             30.9     PT/INR - ( 10 Nov 2019 11:00 )   PT: 13.10 sec;   INR: 1.14 ratio         PTT - ( 10 Nov 2019 11:00 )  PTT:29.8 sec  11-10    139  |  104  |  <3<L>  ----------------------------<  94  3.9   |  22  |  <0.5<L>    Ca    9.4      10 Nov 2019 11:00  Mg     2.1     11-10    TPro  7.2  /  Alb  4.2  /  TBili  0.4  /  DBili  x   /  AST  17  /  ALT  14  /  AlkPhos  53  11-09    LIVER FUNCTIONS - ( 2019 18:20 )  Alb: 4.2 g/dL / Pro: 7.2 g/dL / ALK PHOS: 53 U/L / ALT: 14 U/L / AST: 17 U/L / GGT: x                 Urinalysis Basic - ( 10 Nov 2019 10:06 )    Color: Yellow / Appearance: Clear / S.020 / pH: x  Gluc: x / Ketone: 80  / Bili: Negative / Urobili: 0.2   Blood: x / Protein: Negative / Nitrite: Negative   Leuk Esterase: Negative / RBC: 26-50 /HPF / WBC 1-2 /HPF   Sq Epi: x / Non Sq Epi: Moderate /HPF / Bacteria: Few      RADIOLOGY & ADDITIONAL TESTS:  < from: US Pelvis Complete (11.10.19 @ 11:14) >  IMPRESSION:    Trace free pelvic fluid, nonspecific.    Essentially unremarkable transabdominal pelvic sonogram.    < end of copied text >      MEDICATIONS:  MEDICATIONS  (STANDING):  chlorhexidine 4% Liquid 1 Application(s) Topical <User Schedule>  famotidine Injectable 20 milliGRAM(s) IV Push daily  ferrous    sulfate 325 milliGRAM(s) Oral daily  lactulose Syrup 10 Gram(s) Oral three times a day    MEDICATIONS  (PRN):  acetaminophen   Tablet .. 650 milliGRAM(s) Oral every 6 hours PRN Temp greater or equal to 38C (100.4F), Mild Pain (1 - 3)  ondansetron Injectable 4 milliGRAM(s) IV Push every 8 hours PRN Nausea and/or Vomiting      HOME MEDICATIONS:

## 2019-11-15 DIAGNOSIS — N92.0 EXCESSIVE AND FREQUENT MENSTRUATION WITH REGULAR CYCLE: ICD-10-CM

## 2019-11-15 DIAGNOSIS — D64.9 ANEMIA, UNSPECIFIED: ICD-10-CM

## 2019-11-15 DIAGNOSIS — K59.00 CONSTIPATION, UNSPECIFIED: ICD-10-CM

## 2019-11-15 DIAGNOSIS — N83.209 UNSPECIFIED OVARIAN CYST, UNSPECIFIED SIDE: ICD-10-CM

## 2021-08-06 NOTE — ED ADULT NURSE NOTE - CAS EDN DISCHARGE INTERVENTIONS
Patient vital signs stable and meeting expected outcomes.  Bottle feeding  overnight.  Up independently and voiding adequately.  Has denied need for pain meds.  Able to perform all cares for self and infant.  Bonding well with baby.  Pt sister present and supportive at bedside.  Will continue to monitor.       IV intact

## 2023-03-23 NOTE — PATIENT PROFILE ADULT - PATIENT REPRESENTATIVE: ( YOU CAN CHOOSE ANY PERSON THAT CAN ASSIST YOU WITH YOUR HEALTH CARE PREFERENCES, DOES NOT HAVE TO BE A SPOUSE, IMMEDIATE FAMILY OR SIGNIFICANT OTHER/PARTNER)
Physical Therapy Visit    Visit Type: Daily Treatment Note  Visit: 3  Referring Provider: Eugenio Rangel MD  Medical Diagnosis (from order): Diagnosis Information    Diagnosis  724.03 (ICD-9-CM) - M48.062 (ICD-10-CM) - Spinal stenosis of lumbar region with neurogenic claudication  715.35 (ICD-9-CM) - M16.9 (ICD-10-CM) - Osteoarthrosis of hip         SUBJECTIVE                                                                                                               Pt reports intermittent pain throughout the week. Localized to L hip today.       OBJECTIVE                                                                                                                         Palpation  Left  - Rectus Femoris: tender  - Vastus Lateralis Obliques (VLO): tender                 Treatment     Therapeutic Exercise  Quad stretch with strap 3 x 20 seconds  Prone hip extension 2 x 8  SKTC with towel 3 x 10 seconds  Hip adduction with 8 in ball 2 x 10  Standing hip abduction 2# 3 x 8  Standing hip flexion 2# 3 x 10  Review of HEP      Manual Therapy   soft tissue mobilization to L quadricep  L hip long axis distraction    Skilled input: verbal instruction/cues and tactile instruction/cues    Writer verbally educated and received verbal consent for hand placement, positioning of patient, and techniques to be performed today from patient for clothing adjustments for techniques, therapist position for techniques and hand placement and palpation for techniques as described above and how they are pertinent to the patient's plan of care.    Home Exercise Program  Access Code: [email or web address]  URL: [email or web address]  Date: 03/23/2023  Prepared by: Lianna Mills    Exercises  - Supine Bridge  - 3-4 x weekly - 3 sets - 5 reps  - Clamshell  - 3-4 x weekly - 3 sets - 5 reps  - Supine Lower Trunk Rotation  - 1 x daily - 2 sets - 10 reps  - Supine Posterior Pelvic Tilt  - 1 x daily - 2 sets - 10 reps - 3 seconds  hold  - Modified Sudeep Stretch  - 1 x daily - 45 seconds hold  - Standing Hip Abduction with Counter Support  - 3-4 x weekly - 3 sets - 8 reps  - Supine Hip Adduction Isometric with Ball  - 3-4 x weekly - 2 sets - 10 reps - 3-5 annalisa=conds hold      ASSESSMENT                                                                                                            Pt presents to clinic with increased tenderness to palpation of L quadricep and reports of increased soreness in left hip. Long axis distraction performed with mild relief. Pt introduced to weighted standing hip flexion to improve pt ability to get in/out of car. She tolerated all exercises without exacerbation of symptoms. Pt will continue to benefit from interventions targeting L hip and lumbar strength and mobility.  Education:   - Results of above outlined education: Verbalizes understanding and Needs reinforcement    PLAN                                                                                                                           Suggestions for next session as indicated: Progress per plan of care       Therapy procedure time and total treatment time can be found documented on the Time Entry flowsheet     declines